# Patient Record
(demographics unavailable — no encounter records)

---

## 2024-11-20 NOTE — ADDENDUM
[FreeTextEntry1] : Plan:  #1 clinical status regarding ulcerative colitis discussed with patient.  He is currently asymptomatic from a GI standpoint.  Doing well at current time and clinically in remission.  #2 continue maintenance balsalazide 3 tabs p.o. twice daily.  #3 avoid NSAIDs.  #4 surveillance colonoscopy will be scheduled.  Risks, benefits and alternatives have been discussed.  #5 office revisit in 6 months.

## 2024-11-20 NOTE — ASSESSMENT
[FreeTextEntry1] : Impression:  #1 ulcerative colitis-history of UC of approximately 21 years duration.  Clinically in remission. Surveillance colonoscopy on 10/19/2021 noted for normal appearing colonic mucosa without detection of active colitis. Dysplasia not detected at any site.  #2 colonic polyp-colonoscopy on 10/19/2021 noted for removal of a 6 mm sessile Shah[place] 2b proximal transverse colon hyperplastic polyp.  #3 status post episode of chickenpox.  #4 diabetes mellitus.  #5 COVID infection 4/2022.  #6 history detached retina.

## 2024-11-20 NOTE — PHYSICAL EXAM
[Alert] : alert [Normal Voice/Communication] : normal voice/communication [Healthy Appearing] : healthy appearing [No Acute Distress] : no acute distress [Sclera] : the sclera and conjunctiva were normal [Normal Appearance] : the appearance of the neck was normal [No Neck Mass] : no neck mass was observed [No Respiratory Distress] : no respiratory distress [No Acc Muscle Use] : no accessory muscle use [Respiration, Rhythm And Depth] : normal respiratory rhythm and effort [Auscultation Breath Sounds / Voice Sounds] : lungs were clear to auscultation bilaterally [Heart Rate And Rhythm] : heart rate was normal and rhythm regular [Normal S1, S2] : normal S1 and S2 [Murmurs] : no murmurs [None] : no edema [Bowel Sounds] : normal bowel sounds [Abdomen Tenderness] : non-tender [No Masses] : no abdominal mass palpated [Abdomen Soft] : soft [] : no hepatosplenomegaly [Abnormal Walk] : normal gait [No Clubbing, Cyanosis] : no clubbing or cyanosis of the fingernails [Normal Color / Pigmentation] : normal skin color and pigmentation [Oriented To Time, Place, And Person] : oriented to person, place, and time [Normal Affect] : the affect was normal [Normal Insight/Judgment] : insight and judgment were intact [Normal Mood] : the mood was normal

## 2024-11-20 NOTE — CONSULT LETTER
[Dear  ___] : Dear  [unfilled], [Consult Letter:] : I had the pleasure of evaluating your patient, [unfilled]. [Please see my note below.] : Please see my note below. [Consult Closing:] : Thank you very much for allowing me to participate in the care of this patient.  If you have any questions, please do not hesitate to contact me. [Sincerely,] : Sincerely, [FreeTextEntry2] : Dr. Tao Paulino [FreeTextEntry3] : Blake Mack M.D.

## 2024-11-20 NOTE — HISTORY OF PRESENT ILLNESS
[FreeTextEntry1] : Robby Deleon was evaluated for office revisit on 11/20/2024.  The patient has a history of ulcerative colitis of approximately 21 years duration.  ORV 10/21/16:    -The patient's last colonoscopy was on 5/19/14. The colonic mucosa was completely normal except for mild inflammatory changes in the descending colon. Colonic biopsies were normal except for features of chronic colitis in the descending colon. Inactive proctitis was noted. Dysplasia was not detected.                           -Currently feels well. Asymptomatic at the current time from a GI standpoint. Appetite and weight are stable. Denies any complaints of fever, nausea, vomiting or abdominal pain. He has 3 formed bowel movements daily without any complaints of diarrhea or rectal bleeding.                           -The patient reports no new or interval change to his medical history since last examination. The patient's only medication is Colazol. A dose of 3 tabs p.o. t.i.d. was prescribed the patient is currently only taking 3 tabs b.i.d. He is also on vitamin D 2000 units daily.  ORV 5/24/17:    -Patient presents for routine followup regarding history of ulcerative colitis.                           -Asymptomatic from a GI standpoint. Patient typically has 4 formed brown bowel movements daily without any current complaint of diarrhea, constipation, change in bowel habit or rectal bleeding. Appetite and weight are stable. Reports no complaints of dysphagia, heartburn, nausea, vomiting or abdominal pain.                          -Currently on balsalazide 3 tabs p.o. b.i.d. No other medications.                          -Surveillance colonoscopy was performed on 12/15/16. Normal terminal ileum. Normal colonic mucosa except for minimal distal rectal erythema. No ulceration or friability. Biopsy of the ascending colon and transverse colon was normal. Descending colon biopsy revealed focal active chronic colitis. Sigmoid biopsy and rectum biopsy was noted for mild to moderate chronic inactive colitis. Dysplasia was not detected at any site.  ORV 11/29/17:     -Continues on balsalazide 3 tabs p.o. BID as maintenance therapy for ulcerative colitis. Asymptomatic from a GI standpoint. Has 3-4 formed bowel movements daily without any complaints of diarrhea, rectal bleeding or abdominal pain. Appetite and weight stable. No complaints.              -Lab assessment when last evaluated on 5/24/17 noted for glucose of 123 hemoglobin A1c of 7. Concern over diabetes discussed at that time. Patient advised to establish care with a PMD for general medical care as well as assessment for diabetes but HAS not yet done so.  ORV 1/9/18:     -Patient presents for followup regarding history of ulcerative colitis. Currently asymptomatic and in clinical remission. Patient has 3 formed bowel movements daily without any complaints of diarrhea or rectal bleeding. Appetite and weight are stable. Reports no complaints of dysphagia, heartburn, nausea, vomiting or abdominal. Currently on regimen of balsalazide 3 tabs p.o. b.i.d.             -Laboratory assessment on 11/29/17 when patient last assessed noted for hemoglobin A1c of 7 and nonfasting glucose of 233. Patient advised to consult with internist regarding diabetes but has not done so.  ORV 7/10/19:     -Patient presents for followup regarding ulcerative colitis. Currently feeling well. However, he had contacted me on 5/6/19 indicating that he was experiencing a mild flare of 6-7 weeks duration. He had delayed contacting me until that time. Symptoms described as increased frequency of smaller formed bowel movements and was having approximately 6-8 small formed stools daily with urgency and passage of mucus but no blood. There was no report of abdominal pain, fever, nausea or vomiting at that time. At that time compliance with regimen of balsalazide 3 tabs t.i.d. was reinforced and mesalamine enemas were prescribed. The patient indicates that he utilize the mesalamine enemas for approximately 4 nights in noticed prompt symptomatic improvement. Stopped enemas on his own. He remains on balsalazide 3 tabs t.i.d.                 -Probably reports doing well. Feels he is back to baseline. As noted, medication includes balsalazide 3 tabs t.i.d. Now having approximately 3 formed bowel movements daily which is his baseline. Denies any current complaints of diarrhea for rectal bleeding. Reports no fever. Appetite and weight stable. Denies abdominal pain.                   -Prior evaluation of 1/9/19 laboratory tests were reviewed including glucose 141 and hemoglobin A1c of 7.6. Patient had been contacted at that time. Patient was advised of diagnosis of diabetes mellitus. He was strongly urged to establish care with a primary medical doctor. He has not yet done so.  ORV 1/10/2020:     -Currently doing well from ulcerative colitis standpoint. Asymptomatic and in clinical remission. Has 3 formed bowel movements daily without any complaints of diarrhea, constipation or rectal bleeding. No abdominal pain. Appetite and weight are stable. Reports no complaints of dysphagia, heartburn, nausea or vomiting. Patient remains on balsalazide 3 tabs b.i.d.                -Evaluation at last assessment on 7/10/19 was noted for normal CBC and liver enzymes. CRP normal at 0.29 and ESR of 11. Elevated glucose noted an elevated at 152 with hemoglobin A1c of 7.9. Patient was again notified of diabetes. Patient was strongly encouraged to initiate care with PMD regarding evaluation and treatment of diabetes but still has not yet done so.  ORV 7/24/2020:     -Presents for followup regarding ulcerative colitis. Doing well at current time. Asymptomatic from GI standpoint. Has 2-3 formed brown stools daily without any complaints of diarrhea or rectal bleeding. Denies fever. Appetite and weight are stable. Reports no complaints of dysphagia, heartburn, nausea, vomiting or abdominal pain. Currently on balsalazide 3 tabs p.o. b.i.d. Laboratory assessment of 1/10/2020 again reviewed noted for glucose 176 and hemoglobin A1c of 8.9. Patient previously advised on multiple occasions to establish care with a PMD for treatment of diabetes but he has not yet done so.  ORV 2/23/2021:     -Patient presents for follow-up for ongoing monitoring and treatment of ulcerative colitis.  He reports he is doing well on his current regimen of balsalazide 3 tabs p.o. twice daily.  Indicates typically having 4 soft formed [place] 4 bowel movements throughout the day.  Denies any diarrhea or rectal bleeding.  Appetite and weight are stable.  Denies any complaints of dysphagia, nausea, vomiting or abdominal pain.                                -History of diabetes discussed at length with patient.  Despite strong recommendation for initiation of care by PMD and/or endocrinology he has pursued no treatment regarding diagnosis of diabetes.  Had extensive discussion with patient regarding possible adverse outcomes and complications from uncontrolled diabetes.  Strongly emphasized the importance of initiating care with a practitioner for treatment of diabetes mellitus.  He does indicate that within the last year he has been evaluated by an ophthalmologist and reports normal findings.  ORV 9/1/2021:     -Doing well from standpoint of ulcerative colitis on current regimen of balsalazide 3 tabs p.o. twice daily.  Typically has 2-3 formed bowel movements daily without any complaints of diarrhea or rectal bleeding.  Denies fever.  Appetite and weight stable.  Reports no complaints of dysphagia, heartburn, nausea or vomiting.                             -Diabetes under improved control.  Currently on regimen of Metformin 500 mg a.m./1000 mg p.m.  Last hemoglobin A1c 7.5.  Laboratory assessment 4/7/2021 noted for creatinine 0.98 and normal liver enzymes including ALT 17.  CBC normal including hemoglobin 14.4 and hematocrit 42.8.  COLONOSCOPY 10/19/2021:    -Surveillance colonoscopy was performed on 10/19/2021 regarding history of ulcerative colitis. Total colonoscopy was performed to the cecum with ileoscopy. Normal terminal ileum. A 6 mm sessile Shahida 2b proximal transverse colon hyperplastic polyp was removed. Colonic mucosa normal throughout. Active colitis not detected. Biopsy of cecum, ascending colon and transverse colon normal. Descending colon and sigmoid colon biopsies consistent with chronic inactive colitis. Rectum biopsy revealed focally active colitis. As noted, no overt features of colitis on exam macroscopically with normal appearance. Dysplasia not detected at any site. Surveillance colonoscopy advised in 2 years.   ORV 3/9/2022:     -Evaluated for follow-up regarding ulcerative colitis.  Doing well at current time on balsalazide 3 tabs p.o. twice daily (750 mg/tab).  Has 2-3 formed bowel movements daily without any current complaints of diarrhea or rectal bleeding.  Denies fever.  Appetite and weight are stable.  Denies complaints of nausea, vomiting or abdominal pain.  ORV 1/9/2023:     -Presents for follow-up regarding history of ulcerative colitis.  Doing well at current time on maintenance balsalazide 3 tabs p.o. twice daily.  Currently has 2 formed bowel movements daily without any complaints of diarrhea or rectal bleeding.  Appetite and weight is stable.  Reports no complaints of nausea, vomiting or abdominal pain.  Had COVID 4/2022 with uneventful outcome.  Describes some type of URI but not COVID in the fall 2022.  Currently recovering from retinal detachment requiring surgical repair.  ORV 11/29/2023:     -Evaluated for follow-up regarding ulcerative colitis.  Currently doing well on regimen of balsalazide 3 tabs p.o. 3 times daily.  Has 2 formed bowel movements daily without any complaints of diarrhea or rectal bleeding.  Indicates appetite and weight are stable.  Reports no dysphagia, heartburn, nausea, vomiting or abdominal pain.                                  -Medications: Metformin 1000 mg twice daily, balsalazide 3 tabs twice daily.  ORV 5/29/2024:     -Presents for GI follow-up regarding history of ulcerative colitis.  Reports doing well.  No complaints.  Has 2 formed bowel movements daily without any complaints of diarrhea or rectal bleeding.  Reports no fever, nausea, vomiting or abdominal pain.  Maintained on balsalazide 3 tabs p.o. twice daily.                              -Medications: Metformin 1000 mg twice daily, balsalazide 3 tabs p.o. twice daily.  ORV 11/20/2024:     -Evaluated for GI follow-up regarding ulcerative colitis.  Continues to do well on current regimen of balsalazide 3 tabs p.o. twice daily.  He has 2 formed bowel movements daily without any complaints of diarrhea, rectal bleeding or any abdominal pain.  No current GI symptoms.                                 -Medications: Balsalazide 3 tabs p.o. twice daily, metformin 1000 mg twice daily.

## 2025-03-19 NOTE — ASSESSMENT
[FreeTextEntry1] : .  61 y/o M with DM, HLD, and UC, presenting for f/u. HPI as above.  # DM - f/u A1c - dietician referral - c/w metformin 1000mg po BID - UTD optho 6/2024; no DM retinopathy - podiatry referral  # HLD - f/u lipid panel after starting statin - 8/2024 CT calcium score 21 - cardio eval  # UC dx 2004; stable - c/w balsalazide 2.25g po BID, tolerating well - following with GI, surveillance as per GI  # HCM - reviewed 5/2024 Hep B surface ab non-reactive, pt will consider vaccine - 3/2025 scope with neg biopsies, report not available in EMR at time of visit - psa - s/p flu vaccine  f/u 4-6 months.

## 2025-03-19 NOTE — HEALTH RISK ASSESSMENT
[0] : 2) Feeling down, depressed, or hopeless: Not at all (0) [PHQ-2 Negative - No further assessment needed] : PHQ-2 Negative - No further assessment needed [[id number]] : 0 [Never] : Never

## 2025-03-19 NOTE — PHYSICAL EXAM
[No Acute Distress] : no acute distress [Well-Appearing] : well-appearing [No Lymphadenopathy] : no lymphadenopathy [No Respiratory Distress] : no respiratory distress  [No Accessory Muscle Use] : no accessory muscle use [Clear to Auscultation] : lungs were clear to auscultation bilaterally [Normal Rate] : normal rate  [Regular Rhythm] : with a regular rhythm [Soft] : abdomen soft [Non Tender] : non-tender [Non-distended] : non-distended [Coordination Grossly Intact] : coordination grossly intact [Normal Affect] : the affect was normal [Normal Insight/Judgement] : insight and judgment were intact [Penis Abnormality] : normal circumcised penis [Testes Tenderness] : no tenderness of the testes [Testes Mass (___cm)] : there were no testicular masses